# Patient Record
Sex: FEMALE | Race: WHITE
[De-identification: names, ages, dates, MRNs, and addresses within clinical notes are randomized per-mention and may not be internally consistent; named-entity substitution may affect disease eponyms.]

---

## 2021-04-19 ENCOUNTER — HOSPITAL ENCOUNTER (OUTPATIENT)
Dept: HOSPITAL 35 - LAB | Age: 61
End: 2021-04-19
Attending: THORACIC SURGERY (CARDIOTHORACIC VASCULAR SURGERY)
Payer: COMMERCIAL

## 2021-04-19 ENCOUNTER — HOSPITAL ENCOUNTER (OUTPATIENT)
Dept: HOSPITAL 35 - ULTRA | Age: 61
End: 2021-04-19
Attending: THORACIC SURGERY (CARDIOTHORACIC VASCULAR SURGERY)
Payer: COMMERCIAL

## 2021-04-19 DIAGNOSIS — Z20.822: ICD-10-CM

## 2021-04-19 DIAGNOSIS — Z01.818: Primary | ICD-10-CM

## 2021-04-19 DIAGNOSIS — I65.23: ICD-10-CM

## 2021-04-19 DIAGNOSIS — Z01.812: Primary | ICD-10-CM

## 2021-04-19 DIAGNOSIS — J98.11: ICD-10-CM

## 2021-04-19 DIAGNOSIS — I25.119: ICD-10-CM

## 2021-04-19 LAB
ALBUMIN SERPL-MCNC: 4.2 G/DL (ref 3.4–5)
ALT SERPL-CCNC: 37 U/L (ref 14–59)
ANION GAP SERPL CALC-SCNC: 9 MMOL/L (ref 7–16)
APTT BLD: 24.3 SECONDS (ref 24.5–32.8)
AST SERPL-CCNC: 17 U/L (ref 15–37)
BACTERIA-REFLEX: (no result) /HPF
BASOPHILS NFR BLD AUTO: 1 % (ref 0–2)
BILIRUB SERPL-MCNC: 0.4 MG/DL (ref 0.2–1)
BILIRUB UR-MCNC: NEGATIVE MG/DL
BUN SERPL-MCNC: 15 MG/DL (ref 7–18)
CALCIUM SERPL-MCNC: 9.6 MG/DL (ref 8.5–10.1)
CHLORIDE SERPL-SCNC: 102 MMOL/L (ref 98–107)
CO2 SERPL-SCNC: 28 MMOL/L (ref 21–32)
COLOR UR: YELLOW
CREAT SERPL-MCNC: 0.8 MG/DL (ref 0.6–1)
EOSINOPHIL NFR BLD: 1.2 % (ref 0–3)
ERYTHROCYTE [DISTWIDTH] IN BLOOD BY AUTOMATED COUNT: 13.8 % (ref 10.5–14.5)
FINE GRAN CASTS #/AREA URNS LPF: (no result) /LPF
GLUCOSE SERPL-MCNC: 165 MG/DL (ref 74–106)
GRANULOCYTES NFR BLD MANUAL: 68.3 % (ref 36–66)
HCT VFR BLD CALC: 42.1 % (ref 37–47)
HGB BLD-MCNC: 14.3 GM/DL (ref 12–15)
HYALINE CASTS #/AREA URNS LPF: (no result) /LPF
INR PPP: 1
KETONES UR STRIP-MCNC: NEGATIVE MG/DL
LYMPHOCYTES NFR BLD AUTO: 22.9 % (ref 24–44)
MCH RBC QN AUTO: 29.4 PG (ref 26–34)
MCHC RBC AUTO-ENTMCNC: 34 G/DL (ref 28–37)
MCV RBC: 86.2 FL (ref 80–100)
MONOCYTES NFR BLD: 6.6 % (ref 1–8)
NEUTROPHILS # BLD: 6 THOU/UL (ref 1.4–8.2)
PLATELET # BLD: 297 THOU/UL (ref 150–400)
POTASSIUM SERPL-SCNC: 4 MMOL/L (ref 3.5–5.1)
PROT SERPL-MCNC: 7.4 G/DL (ref 6.4–8.2)
PROTHROMBIN TIME: 10.9 SECONDS (ref 9.3–11.4)
RBC # BLD AUTO: 4.88 MIL/UL (ref 4.2–5)
RBC # UR STRIP: NEGATIVE /UL
SODIUM SERPL-SCNC: 139 MMOL/L (ref 136–145)
SP GR UR STRIP: 1.01 (ref 1–1.03)
SQUAMOUS: (no result) /LPF (ref 0–3)
URINE CLARITY: (no result)
URINE GLUCOSE-RANDOM*: NEGATIVE
URINE LEUKOCYTES-REFLEX: (no result)
URINE NITRITE-REFLEX: NEGATIVE
URINE PROTEIN (DIPSTICK): NEGATIVE
URINE WBC-REFLEX: (no result) /HPF (ref 0–5)
UROBILINOGEN UR STRIP-ACNC: 0.2 E.U./DL (ref 0.2–1)
WBC # BLD AUTO: 8.8 THOU/UL (ref 4–11)

## 2021-04-20 LAB
EST. AVERAGE GLUCOSE BLD GHB EST-MCNC: 160 MG/DL
GLYCOHEMOGLOBIN (HGB A1C): 7.2 % (ref 4.8–5.6)

## 2021-04-21 ENCOUNTER — HOSPITAL ENCOUNTER (INPATIENT)
Dept: HOSPITAL 35 - CATH | Age: 61
LOS: 6 days | Discharge: HOME | DRG: 236 | End: 2021-04-27
Attending: INTERNAL MEDICINE | Admitting: INTERNAL MEDICINE
Payer: COMMERCIAL

## 2021-04-21 VITALS — SYSTOLIC BLOOD PRESSURE: 126 MMHG | DIASTOLIC BLOOD PRESSURE: 53 MMHG

## 2021-04-21 VITALS — HEIGHT: 64.02 IN | WEIGHT: 193.5 LBS | BODY MASS INDEX: 33.03 KG/M2

## 2021-04-21 VITALS — DIASTOLIC BLOOD PRESSURE: 65 MMHG | SYSTOLIC BLOOD PRESSURE: 126 MMHG

## 2021-04-21 VITALS — SYSTOLIC BLOOD PRESSURE: 120 MMHG | DIASTOLIC BLOOD PRESSURE: 63 MMHG

## 2021-04-21 VITALS — DIASTOLIC BLOOD PRESSURE: 63 MMHG | SYSTOLIC BLOOD PRESSURE: 125 MMHG

## 2021-04-21 VITALS — DIASTOLIC BLOOD PRESSURE: 63 MMHG | SYSTOLIC BLOOD PRESSURE: 114 MMHG

## 2021-04-21 VITALS — SYSTOLIC BLOOD PRESSURE: 143 MMHG | DIASTOLIC BLOOD PRESSURE: 79 MMHG

## 2021-04-21 VITALS — DIASTOLIC BLOOD PRESSURE: 70 MMHG | SYSTOLIC BLOOD PRESSURE: 132 MMHG

## 2021-04-21 VITALS — DIASTOLIC BLOOD PRESSURE: 71 MMHG | SYSTOLIC BLOOD PRESSURE: 122 MMHG

## 2021-04-21 DIAGNOSIS — I10: ICD-10-CM

## 2021-04-21 DIAGNOSIS — I65.21: ICD-10-CM

## 2021-04-21 DIAGNOSIS — Z91.040: ICD-10-CM

## 2021-04-21 DIAGNOSIS — I25.10: Primary | ICD-10-CM

## 2021-04-21 DIAGNOSIS — E66.01: ICD-10-CM

## 2021-04-21 DIAGNOSIS — Z79.899: ICD-10-CM

## 2021-04-21 DIAGNOSIS — K21.9: ICD-10-CM

## 2021-04-21 DIAGNOSIS — Z82.49: ICD-10-CM

## 2021-04-21 DIAGNOSIS — R53.81: ICD-10-CM

## 2021-04-21 DIAGNOSIS — E78.00: ICD-10-CM

## 2021-04-21 DIAGNOSIS — E11.9: ICD-10-CM

## 2021-04-21 DIAGNOSIS — Z79.82: ICD-10-CM

## 2021-04-21 PROCEDURE — 47002: CPT

## 2021-04-21 PROCEDURE — 56524: CPT

## 2021-04-21 PROCEDURE — 57167: CPT

## 2021-04-21 PROCEDURE — 47001 NDL BIOPSY LVR TM OTH MAJ PX: CPT

## 2021-04-21 PROCEDURE — 55415: CPT

## 2021-04-21 PROCEDURE — 56527: CPT

## 2021-04-21 PROCEDURE — 48889: CPT

## 2021-04-21 PROCEDURE — 50249: CPT

## 2021-04-21 PROCEDURE — 50010 RENAL EXPLORATION: CPT

## 2021-04-21 PROCEDURE — 10081 I&D PILONIDAL CYST COMP: CPT

## 2021-04-21 PROCEDURE — 62950: CPT

## 2021-04-21 PROCEDURE — 52131: CPT

## 2021-04-21 PROCEDURE — 65020: CPT

## 2021-04-21 PROCEDURE — 56534: CPT

## 2021-04-21 PROCEDURE — 53358: CPT

## 2021-04-21 PROCEDURE — 56719: CPT

## 2021-04-21 PROCEDURE — 56531: CPT

## 2021-04-21 PROCEDURE — 47297: CPT

## 2021-04-21 PROCEDURE — 54118: CPT

## 2021-04-21 PROCEDURE — 57093: CPT

## 2021-04-21 PROCEDURE — 47000 NEEDLE BIOPSY OF LIVER PERQ: CPT

## 2021-04-21 PROCEDURE — 65003: CPT

## 2021-04-21 PROCEDURE — 65090: CPT

## 2021-04-21 PROCEDURE — 56760: CPT

## 2021-04-21 PROCEDURE — 50643: CPT

## 2021-04-21 PROCEDURE — 56668: CPT

## 2021-04-21 PROCEDURE — 65135 INSERT OCULAR IMPLANT: CPT

## 2021-04-21 PROCEDURE — 56525: CPT

## 2021-04-21 PROCEDURE — 53327: CPT

## 2021-04-21 PROCEDURE — 50953 ENDOSCOPY OF URETER: CPT

## 2021-04-21 PROCEDURE — 50668: CPT

## 2021-04-21 PROCEDURE — 56528: CPT

## 2021-04-21 PROCEDURE — 56526: CPT

## 2021-04-21 PROCEDURE — 52287 CYSTOSCOPY CHEMODENERVATION: CPT

## 2021-04-21 PROCEDURE — 56455: CPT

## 2021-04-21 PROCEDURE — 10078: CPT

## 2021-04-21 PROCEDURE — 62110: CPT

## 2021-04-21 PROCEDURE — 51301: CPT

## 2021-04-22 VITALS — SYSTOLIC BLOOD PRESSURE: 101 MMHG | DIASTOLIC BLOOD PRESSURE: 56 MMHG

## 2021-04-22 VITALS — DIASTOLIC BLOOD PRESSURE: 63 MMHG | SYSTOLIC BLOOD PRESSURE: 112 MMHG

## 2021-04-22 VITALS — DIASTOLIC BLOOD PRESSURE: 59 MMHG | SYSTOLIC BLOOD PRESSURE: 104 MMHG

## 2021-04-22 VITALS — SYSTOLIC BLOOD PRESSURE: 105 MMHG | DIASTOLIC BLOOD PRESSURE: 59 MMHG

## 2021-04-22 VITALS — SYSTOLIC BLOOD PRESSURE: 104 MMHG | DIASTOLIC BLOOD PRESSURE: 57 MMHG

## 2021-04-22 VITALS — SYSTOLIC BLOOD PRESSURE: 102 MMHG | DIASTOLIC BLOOD PRESSURE: 60 MMHG

## 2021-04-22 VITALS — DIASTOLIC BLOOD PRESSURE: 58 MMHG | SYSTOLIC BLOOD PRESSURE: 106 MMHG

## 2021-04-22 VITALS — SYSTOLIC BLOOD PRESSURE: 103 MMHG | DIASTOLIC BLOOD PRESSURE: 51 MMHG

## 2021-04-22 VITALS — DIASTOLIC BLOOD PRESSURE: 71 MMHG | SYSTOLIC BLOOD PRESSURE: 98 MMHG

## 2021-04-22 VITALS — SYSTOLIC BLOOD PRESSURE: 109 MMHG | DIASTOLIC BLOOD PRESSURE: 59 MMHG

## 2021-04-22 VITALS — DIASTOLIC BLOOD PRESSURE: 59 MMHG | SYSTOLIC BLOOD PRESSURE: 105 MMHG

## 2021-04-22 VITALS — DIASTOLIC BLOOD PRESSURE: 66 MMHG | SYSTOLIC BLOOD PRESSURE: 102 MMHG

## 2021-04-22 VITALS — DIASTOLIC BLOOD PRESSURE: 61 MMHG | SYSTOLIC BLOOD PRESSURE: 100 MMHG

## 2021-04-22 LAB
ANION GAP SERPL CALC-SCNC: 10 MMOL/L (ref 7–16)
APTT BLD: 25.1 SECONDS (ref 24.5–32.8)
APTT BLD: 27.6 SECONDS (ref 24.5–32.8)
BASE EXCESS STD BLDA CALC-SCNC: -3 MMOL/L
BASE EXCESS STD BLDA CALC-SCNC: 0 MMOL/L
BE(VIVO): -3.7 MMOL/L
BE(VIVO): -5 MMOL/L
BE(VIVO): -5.4 MMOL/L
BUN SERPL-MCNC: 12 MG/DL (ref 7–18)
CALCIUM SERPL-MCNC: 8.2 MG/DL (ref 8.5–10.1)
CHLORIDE SERPL-SCNC: 109 MMOL/L (ref 98–107)
CO2 SERPL-SCNC: 24 MMOL/L (ref 21–32)
CREAT SERPL-MCNC: 0.8 MG/DL (ref 0.6–1)
ERYTHROCYTE [DISTWIDTH] IN BLOOD BY AUTOMATED COUNT: 13 % (ref 10.5–14.5)
ERYTHROCYTE [DISTWIDTH] IN BLOOD BY AUTOMATED COUNT: 13.6 % (ref 10.5–14.5)
FIBRINOGEN PPP-MCNC: 181 MG/DL (ref 210–360)
GLUCOSE BLD-MCNC: 143 MG/DL (ref 70–99)
GLUCOSE BLD-MCNC: 167 MG/DL (ref 70–99)
GLUCOSE SERPL-MCNC: 206 MG/DL (ref 74–106)
HCO3 BLD-SCNC: 18.2 MMOL/L (ref 22–26)
HCO3 BLD-SCNC: 19.8 MMOL/L (ref 22–26)
HCO3 BLD-SCNC: 21.1 MMOL/L (ref 22–26)
HCO3 BLDA-SCNC: 22.1 MMOL/L (ref 22–26)
HCO3 BLDA-SCNC: 25 MMOL/L (ref 22–26)
HCT VFR BLD AUTO: 23 % (ref 37–47)
HCT VFR BLD AUTO: 33 % (ref 37–47)
HCT VFR BLD CALC: 22.1 % (ref 37–47)
HCT VFR BLD CALC: 29.1 % (ref 37–47)
HGB BLD-MCNC: 10.2 GM/DL (ref 12–15)
HGB BLD-MCNC: 11.2 G/DL (ref 12–15)
HGB BLD-MCNC: 6.8 G/DL (ref 12–15)
HGB BLD-MCNC: 7.7 GM/DL (ref 12–15)
INR PPP: 1.12
INR PPP: 1.42
MAGNESIUM SERPL-MCNC: 2.2 MG/DL (ref 1.8–2.4)
MCH RBC QN AUTO: 30.2 PG (ref 26–34)
MCH RBC QN AUTO: 30.4 PG (ref 26–34)
MCHC RBC AUTO-ENTMCNC: 34.9 G/DL (ref 28–37)
MCHC RBC AUTO-ENTMCNC: 35.1 G/DL (ref 28–37)
MCV RBC: 86.3 FL (ref 80–100)
MCV RBC: 86.6 FL (ref 80–100)
PCO2 BLD: 29.1 MMHG (ref 35–45)
PCO2 BLD: 35.8 MMHG (ref 35–45)
PCO2 BLD: 37.1 MMHG (ref 35–45)
PCO2 BLDA: 36.4 MMHG (ref 35–45)
PCO2 BLDA: 40.9 MMHG (ref 35–45)
PH BLDA: 7.39 [PH] (ref 7.36–7.45)
PH BLDA: 7.39 [PH] (ref 7.36–7.45)
PLATELET # BLD: 161 THOU/UL (ref 150–400)
PLATELET # BLD: 195 THOU/UL (ref 150–400)
PO2 BLD: 108.9 MMHG (ref 80–100)
PO2 BLD: 161.7 MMHG (ref 80–100)
PO2 BLD: 97.7 MMHG (ref 80–100)
POC CA IONIZED: 4.8 MG/DL (ref 4.5–5.3)
POC CA IONIZED: 5.1 MG/DL (ref 4.5–5.3)
POTASSIUM SERPL-SCNC: 3.3 MMOL/L (ref 3.5–5.1)
POTASSIUM SERPL-SCNC: 3.4 MMOL/L (ref 3.5–5.1)
POTASSIUM SERPL-SCNC: 3.4 MMOL/L (ref 3.5–5.1)
PROTHROMBIN TIME: 12.1 SECONDS (ref 9.3–11.4)
PROTHROMBIN TIME: 15.2 SECONDS (ref 9.3–11.4)
RBC # BLD AUTO: 2.56 MIL/UL (ref 4.2–5)
RBC # BLD AUTO: 3.36 MIL/UL (ref 4.2–5)
SODIUM SERPL-SCNC: 140 MMOL/L (ref 136–145)
SODIUM SERPL-SCNC: 143 MMOL/L (ref 136–145)
SODIUM SERPL-SCNC: 143 MMOL/L (ref 136–145)
WBC # BLD AUTO: 15.9 THOU/UL (ref 4–11)
WBC # BLD AUTO: 23.1 THOU/UL (ref 4–11)

## 2021-04-22 NOTE — NUR
chart review. note she will be going for or today for cabg. unable to visit
with her. will cont following as needed for dc needs. noted independent prior
to hospital.

## 2021-04-22 NOTE — NUR
Nutrition: Planned CABG today. Consult received. RD will followup to address
education needs when out of ICU.

## 2021-04-22 NOTE — NUR
Patient was able to get some sleep this shift. Heart rate and rhythm stable.
Blood pressures stable. Adequate oxygenation on room air. Chlorhexadine bath
given at HS and this am. Patient voiding adequate amounts. NPO since midnight.
Patient states she is ready for surgery. Patient is progressing towards goals.
See documentation on interventions for assessment details. No am lab ordered
for today.

## 2021-04-22 NOTE — NUR
1330-RECEIVED FROM O.R. S/P CABG W OH TEAM IN ATTENDANCE.NO GTTS.--VW
1400-FR.ROSANNE IN.--VW
1530-FRIEND PALLAVI & HIS WIFE IN FOR BRIEF VISIT.--VW
1830-PT HAS DONE WELL. R.T. WORKING W PT.CPAP TRIAL DONE-VOLUMES A LITTLE LOW
BUT OVERALL DOING WELL. PT VERY AGITATED & ANXIOUS AT TIMES.MUCH REASSURANCE
GIVEN.--VW
1850-CARE TURNED OVER TO ONCOMING RN.--VW

## 2021-04-23 VITALS — DIASTOLIC BLOOD PRESSURE: 64 MMHG | SYSTOLIC BLOOD PRESSURE: 123 MMHG

## 2021-04-23 VITALS — DIASTOLIC BLOOD PRESSURE: 64 MMHG | SYSTOLIC BLOOD PRESSURE: 117 MMHG

## 2021-04-23 VITALS — DIASTOLIC BLOOD PRESSURE: 80 MMHG | SYSTOLIC BLOOD PRESSURE: 136 MMHG

## 2021-04-23 LAB
ANION GAP SERPL CALC-SCNC: 13 MMOL/L (ref 7–16)
BUN SERPL-MCNC: 14 MG/DL (ref 7–18)
CALCIUM SERPL-MCNC: 7.9 MG/DL (ref 8.5–10.1)
CHLORIDE SERPL-SCNC: 106 MMOL/L (ref 98–107)
CO2 SERPL-SCNC: 22 MMOL/L (ref 21–32)
CREAT SERPL-MCNC: 0.7 MG/DL (ref 0.6–1)
ERYTHROCYTE [DISTWIDTH] IN BLOOD BY AUTOMATED COUNT: 13.9 % (ref 10.5–14.5)
GLUCOSE SERPL-MCNC: 162 MG/DL (ref 74–106)
HCT VFR BLD CALC: 25.7 % (ref 37–47)
HGB BLD-MCNC: 8.8 GM/DL (ref 12–15)
MAGNESIUM SERPL-MCNC: 2.1 MG/DL (ref 1.8–2.4)
MCH RBC QN AUTO: 29.6 PG (ref 26–34)
MCHC RBC AUTO-ENTMCNC: 34.2 G/DL (ref 28–37)
MCV RBC: 86.6 FL (ref 80–100)
PLATELET # BLD: 165 THOU/UL (ref 150–400)
POTASSIUM SERPL-SCNC: 3.6 MMOL/L (ref 3.5–5.1)
RBC # BLD AUTO: 2.97 MIL/UL (ref 4.2–5)
SODIUM SERPL-SCNC: 141 MMOL/L (ref 136–145)
WBC # BLD AUTO: 14.9 THOU/UL (ref 4–11)

## 2021-04-23 NOTE — NUR
Dr. Kelly here.  Update given.   Orders given.  Insulin gtt and Cardene
turned off.  Pt up in chair and tolorating well.

## 2021-04-23 NOTE — EKG
[address]  [address]
Phone:  [phone]                    ELECTROCARDIOGRAM REPORT      
_______________________________________________________________________________
 
Name:       HAJA FRANCIS           Room #:         250-P       ADM IN  
M.R.#:      [id number]     Account #:      [id number]  
Admission:  21    Attend Phys:    Silvino Rendon MD  
Discharge:              Date of Birth:  [birth year]  
                                                          Report #: [id number]
   [id number]
_______________________________________________________________________________
                          [facility]
                                       
Test Date:    2021               Test Time:    13:42:47
Pat Name:     HAJA FRANCIS             Department:   
Patient ID:   [id number]            Room:         250 P
Gender:       F                        Technician:   NAIN
:          1960               Requested By: Errol Pederson
Order Number: [id number] MD:   Santiago Bush
                                 Measurements
Intervals                              Axis          
Rate:         84                       P:            71
IA:           168                      QRS:          60
QRSD:         94                       T:            -1
QT:           411                                    
QTc:          486                                    
                           Interpretive Statements
Sinus rhythm
Borderline prolonged QT interval
No previous ECG available for comparison
Electronically Signed On 2021 7:04:34 CDT by Santiago Bush
[email or web address]
 
 
 
 
 
 
 
 
 
 
 
 
 
 
 
 
 
 
 
 
 
 
  <ELECTRONICALLY SIGNED>
   By: Santiago Bush MD, [facility]    
  21     [id number]
D: 21 1342                           _____________________________________
T: 21 1342                           Santiago Bush MD, FACC      /EPI

## 2021-04-23 NOTE — EKG
[address]
[place]  [address]
Phone:  [phone]                    ELECTROCARDIOGRAM REPORT      
_______________________________________________________________________________
 
Name:       HAJA FRANCIS           Room #:         250-P       ADM IN  
M.R.#:      [id number]     Account #:      [id number]  
Admission:  21    Attend Phys:    Silvino Rendon MD  
Discharge:              Date of Birth:  [birth year]  
                                                          Report #: [id number]
   [id number]
_______________________________________________________________________________
                          [facility]
                                       
Test Date:    2021               Test Time:    07:19:12
Pat Name:     HAJA FRANCSI             Department:   
Patient ID:   [id number]            Room:         250 P
Gender:       F                        Technician:   NAIN
:          1960               Requested By: Errol Pederson
Order Number: [id number] MD:   Kendrick Diaz
                                 Measurements
Intervals                              Axis          
Rate:         93                       P:            46
AL:           134                      QRS:          22
QRSD:         80                       T:            20
QT:           363                                    
QTc:          452                                    
                           Interpretive Statements
Sinus rhythm
Abnormal R-wave progression, early transition
Compared to ECG 2021 13:42:47
No significant changes
Electronically Signed On 2021 8:42:27 CDT by Kendrick Diaz
[email or web address]
 
 
 
 
 
 
 
 
 
 
 
 
 
 
 
 
 
 
 
 
 
  <ELECTRONICALLY SIGNED>
   By: Kendrick Diaz MD, Located within [facility]   
  [id number]
D: 21                           _____________________________________
T: 21                           Kendrick Diaz MD, [facility]     /EPI

## 2021-04-23 NOTE — NUR
unable to visit with prabhjot rt visitor in room. noted she independent prior
to hospital. no dme. will cont following as needed for dc needs.
dcp home.

## 2021-04-24 VITALS — SYSTOLIC BLOOD PRESSURE: 124 MMHG | DIASTOLIC BLOOD PRESSURE: 80 MMHG

## 2021-04-24 VITALS — DIASTOLIC BLOOD PRESSURE: 62 MMHG | SYSTOLIC BLOOD PRESSURE: 104 MMHG

## 2021-04-24 VITALS — SYSTOLIC BLOOD PRESSURE: 136 MMHG | DIASTOLIC BLOOD PRESSURE: 78 MMHG

## 2021-04-24 VITALS — DIASTOLIC BLOOD PRESSURE: 80 MMHG | SYSTOLIC BLOOD PRESSURE: 144 MMHG

## 2021-04-24 VITALS — SYSTOLIC BLOOD PRESSURE: 131 MMHG | DIASTOLIC BLOOD PRESSURE: 60 MMHG

## 2021-04-24 VITALS — SYSTOLIC BLOOD PRESSURE: 135 MMHG | DIASTOLIC BLOOD PRESSURE: 77 MMHG

## 2021-04-24 VITALS — DIASTOLIC BLOOD PRESSURE: 74 MMHG | SYSTOLIC BLOOD PRESSURE: 122 MMHG

## 2021-04-24 VITALS — SYSTOLIC BLOOD PRESSURE: 112 MMHG | DIASTOLIC BLOOD PRESSURE: 77 MMHG

## 2021-04-24 LAB
ALBUMIN SERPL-MCNC: 3.3 G/DL (ref 3.4–5)
ALT SERPL-CCNC: 29 U/L (ref 30–65)
ANION GAP SERPL CALC-SCNC: 9 MMOL/L (ref 7–16)
AST SERPL-CCNC: 26 U/L (ref 15–37)
BILIRUB SERPL-MCNC: 0.3 MG/DL (ref 0.2–1)
BUN SERPL-MCNC: 10 MG/DL (ref 7–18)
CALCIUM SERPL-MCNC: 7.8 MG/DL (ref 8.5–10.1)
CHLORIDE SERPL-SCNC: 102 MMOL/L (ref 98–107)
CO2 SERPL-SCNC: 26 MMOL/L (ref 21–32)
CREAT SERPL-MCNC: 0.6 MG/DL (ref 0.6–1)
ERYTHROCYTE [DISTWIDTH] IN BLOOD BY AUTOMATED COUNT: 13.7 % (ref 10.5–14.5)
GLUCOSE SERPL-MCNC: 169 MG/DL (ref 74–106)
HCT VFR BLD CALC: 24.3 % (ref 37–47)
HGB BLD-MCNC: 8.5 GM/DL (ref 12–15)
MCH RBC QN AUTO: 30.4 PG (ref 26–34)
MCHC RBC AUTO-ENTMCNC: 35 G/DL (ref 28–37)
MCV RBC: 86.8 FL (ref 80–100)
PLATELET # BLD: 158 THOU/UL (ref 150–400)
POTASSIUM SERPL-SCNC: 4 MMOL/L (ref 3.5–5.1)
PROT SERPL-MCNC: 5.7 G/DL (ref 6.4–8.2)
RBC # BLD AUTO: 2.8 MIL/UL (ref 4.2–5)
SODIUM SERPL-SCNC: 137 MMOL/L (ref 136–145)
WBC # BLD AUTO: 14.6 THOU/UL (ref 4–11)

## 2021-04-24 NOTE — NUR
progressing today. back to bed for late breakfast, back up in chair prior to
lunch. discomfort uncontrolled in am, hydrocodone 2 tabs given with pain
relief. sr, dc'd RIJ introducer and radial dread with guaze/opsite dressings
applied. pct- 40cc drainage. pulling 500cc on incentive spirometer x 10,
taking deep breaths/splinting with nonproductive cough. poor appetite. friends
present for her support.
 transferred to ccu #215 per wheelchair on cardiac monitor and o2.

## 2021-04-24 NOTE — NUR
PT TRANSFERED FROM THE ICU THIS AFTERNOON.  ORIENTED TO ROOM AND BEDSPACE.
COMPLETE PAIN RELIEF FROM MEDS GIVEN PRIOR TO TRRANSFER / CO'S OF NAUSEA,
GIVEN ZOFRAN WITH GOOD RELIEF.  DR LAY REMOVED PLEURAL CHEST TUBE THIS
AFTERNOON - CHEST XRAY COMPLETED.  PT NOT APPEARING TO HAVE ANY DIFFICULTY
BREATHING POST REMOVAL.  ACCUCHECK AS CHARTED.  APPEARS TO BE RESTING AT THE
PRESENT TIME.

## 2021-04-25 VITALS — DIASTOLIC BLOOD PRESSURE: 71 MMHG | SYSTOLIC BLOOD PRESSURE: 119 MMHG

## 2021-04-25 VITALS — DIASTOLIC BLOOD PRESSURE: 63 MMHG | SYSTOLIC BLOOD PRESSURE: 122 MMHG

## 2021-04-25 VITALS — DIASTOLIC BLOOD PRESSURE: 61 MMHG | SYSTOLIC BLOOD PRESSURE: 112 MMHG

## 2021-04-25 VITALS — SYSTOLIC BLOOD PRESSURE: 113 MMHG | DIASTOLIC BLOOD PRESSURE: 74 MMHG

## 2021-04-25 VITALS — DIASTOLIC BLOOD PRESSURE: 66 MMHG | SYSTOLIC BLOOD PRESSURE: 121 MMHG

## 2021-04-25 NOTE — NUR
ASSESSMENTS AS CHARTED, MEDS CHARTED AS GIVEN. TRANSFERED FROM ICU DURING DAY.
PO DAY 2 FROM CABG X 4. UP TO BSC WITH ASSIST X 1. GOOD URINE OUTPUT. WOUND
VAC IN PLACE AND SEALED WELL. PACER WIRES COVERED. CHEST TUBE WAS DC'D TODAY
WITH 3 MM LEFT PNEUMOTHORAX AND MILD CARDIOMEGALY ON CXR. PLAN OF CARE IS WIRE
REMOVAL TODAY, CONTINUE REHAB, SHOWER THEN HOME TODAY OR TOMORROW. FALL
PRECAUTIONS IN PLACE DURING SHIFT.

## 2021-04-25 NOTE — NUR
ASSESSMENT AS CHARTED - MEDS AS PER MAR -  NO CO'S OF PAIN OF NASUEA.  SOURAV
DIET AND FLUIDS.  PATIENT ORDERING FROM [facility] AND STATES SHE FEELS APPITITE
IS COMING BACK.  UP TO THE BSC WITH 1 ASSIST - AMBUALTED IN THE HALLS WITH
PHYS THERAPY AND USE OF WALKER DID WELL - UP TO THE CHAIR FOR THE AM -  PT
GIVEN GAUZE FOR R EYE - PATIENT FEELS SHE IRRIATED EYE WHEN SHE PLACED CONTACT
BACK IN IT AND WANTED TO COVER IT FOR THE AFTERNOON -EVENEING.  ACCUCHECKS AS
CHARTED -  WOUND VAC REMAINS INSITU  NO CO'S AT THE PRESENT TIME.

## 2021-04-26 VITALS — DIASTOLIC BLOOD PRESSURE: 77 MMHG | SYSTOLIC BLOOD PRESSURE: 121 MMHG

## 2021-04-26 VITALS — DIASTOLIC BLOOD PRESSURE: 73 MMHG | SYSTOLIC BLOOD PRESSURE: 128 MMHG

## 2021-04-26 VITALS — SYSTOLIC BLOOD PRESSURE: 119 MMHG | DIASTOLIC BLOOD PRESSURE: 61 MMHG

## 2021-04-26 VITALS — SYSTOLIC BLOOD PRESSURE: 123 MMHG | DIASTOLIC BLOOD PRESSURE: 66 MMHG

## 2021-04-26 VITALS — SYSTOLIC BLOOD PRESSURE: 126 MMHG | DIASTOLIC BLOOD PRESSURE: 68 MMHG

## 2021-04-26 LAB
ALBUMIN SERPL-MCNC: 3.1 G/DL (ref 3.4–5)
ALT SERPL-CCNC: 35 U/L (ref 14–59)
ANION GAP SERPL CALC-SCNC: 7 MMOL/L (ref 7–16)
AST SERPL-CCNC: 20 U/L (ref 15–37)
BASOPHILS NFR BLD AUTO: 0.4 % (ref 0–2)
BILIRUB SERPL-MCNC: 0.5 MG/DL (ref 0.2–1)
BUN SERPL-MCNC: 10 MG/DL (ref 7–18)
CALCIUM SERPL-MCNC: 8.4 MG/DL (ref 8.5–10.1)
CHLORIDE SERPL-SCNC: 104 MMOL/L (ref 98–107)
CO2 SERPL-SCNC: 29 MMOL/L (ref 21–32)
CREAT SERPL-MCNC: 0.7 MG/DL (ref 0.6–1)
EOSINOPHIL NFR BLD: 0.8 % (ref 0–3)
ERYTHROCYTE [DISTWIDTH] IN BLOOD BY AUTOMATED COUNT: 13.9 % (ref 10.5–14.5)
GLUCOSE SERPL-MCNC: 157 MG/DL (ref 74–106)
GRANULOCYTES NFR BLD MANUAL: 75 % (ref 36–66)
HCT VFR BLD CALC: 26.1 % (ref 37–47)
HGB BLD-MCNC: 9 GM/DL (ref 12–15)
LYMPHOCYTES NFR BLD AUTO: 17 % (ref 24–44)
MCH RBC QN AUTO: 30.1 PG (ref 26–34)
MCHC RBC AUTO-ENTMCNC: 34.4 G/DL (ref 28–37)
MCV RBC: 87.5 FL (ref 80–100)
MONOCYTES NFR BLD: 6.8 % (ref 1–8)
NEUTROPHILS # BLD: 9.5 THOU/UL (ref 1.4–8.2)
PLATELET # BLD: 266 THOU/UL (ref 150–400)
POTASSIUM SERPL-SCNC: 3.6 MMOL/L (ref 3.5–5.1)
PROT SERPL-MCNC: 6.1 G/DL (ref 6.4–8.2)
RBC # BLD AUTO: 2.98 MIL/UL (ref 4.2–5)
SODIUM SERPL-SCNC: 140 MMOL/L (ref 136–145)
WBC # BLD AUTO: 12.7 THOU/UL (ref 4–11)

## 2021-04-26 NOTE — EKG
[address] [employer]
[place]  [address]
Phone:  [phone]                    ELECTROCARDIOGRAM REPORT      
_______________________________________________________________________________
 
Name:       HAJA FRANCIS           Room #:         215-       ADM IN  
M.R.#:      [id number]     Account #:      [id number]  
Admission:  21    Attend Phys:    Daniel Gomez MD      
Discharge:              Date of Birth:  60  
                                                          Report #: [id number]
   [id number]
_______________________________________________________________________________
                          [facility]
                                       
Test Date:    2021               Test Time:    07:43:31
Pat Name:     HAJA FRANCIS             Department:   
Patient ID:   [id number]            Room:         215 P
Gender:       F                        Technician:   NAIN
:          1960               Requested By: Liu Kelly
Order Number: [id number] MD:   Kendrick Diaz
                                 Measurements
Intervals                              Axis          
Rate:         74                       P:            63
HI:           143                      QRS:          60
QRSD:         93                       T:            -20
QT:           413                                    
QTc:          459                                    
                           Interpretive Statements
Sinus rhythm
Borderline T wave abnormalities
Compared to ECG 2021 07:19:12
Early R wave progression no longer present
Electronically Signed On 2021 8:31:28 CDT by Kendrick Diaz
[email or web address]
 
 
 
 
 
 
 
 
 
 
 
 
 
 
 
 
 
 
 
 
 
  <ELECTRONICALLY SIGNED>
   By: Kendrick Diaz MD, [facility]   
  [id number]
D: 04/26/21 0743                           _____________________________________
T: 21                           Kendrick Diaz MD, [facility]     /EPI

## 2021-04-26 NOTE — NUR
PT CARE ASSUMED AT 0700. ASSESSMENTS AS CHARTED. MEDICATIONS AS CHARTED. RAC
IV. LH IV; D/C'D, INFILTRATED. SINUS RHYTHM. BSC. ACHS. CXR VIA WHEELCHAIR.
WOUND VAC TO STERNUM. POST OP DAY 4; CABG X 4.

## 2021-04-26 NOTE — NUR
ASSESSMENTS AS CHARTED, MEDS CHARTED AS GIVEN. PATIENT WALKED FROM 40 FEET
THIS EVENING STOPPING FREQUENTLY TO REST. PATIENT PASSING GAS, BUT NO BOWEL
MOVEMENT AS OF YET. FALL PRECAUTIONS IN PLACE DURING SHIFT.

## 2021-04-27 VITALS — SYSTOLIC BLOOD PRESSURE: 112 MMHG | DIASTOLIC BLOOD PRESSURE: 63 MMHG

## 2021-04-27 VITALS — DIASTOLIC BLOOD PRESSURE: 59 MMHG | SYSTOLIC BLOOD PRESSURE: 107 MMHG

## 2021-04-27 VITALS — DIASTOLIC BLOOD PRESSURE: 63 MMHG | SYSTOLIC BLOOD PRESSURE: 112 MMHG

## 2021-04-27 NOTE — NUR
Patient to discharge home today. 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 Family at
bedside. Discussed home health care and patient declines. Patient reports
plan to f/u with Dr Oneil in a week. She reports her family will be
assisting at home. no further needs

## 2021-04-27 NOTE — NUR
PT CARE ASSUMED AT 0700. ASSESSEMENTS AS CHARTED. MEDICATIONS AS CHARTED. RAC
IV. SINUS RHYTHM. HARVEST SITES BANDAGED. CHEST TUBE AND WIRES REMOVED. POST
OP DAY 6; CABG X 4. PT DISCHARGED TO HOME. DISCHARGE PAPERWORK SIGNED.
TELEMETRY D/C'D. IV D/C'D. PT HOME WITH PORTABLE WOUND VAC.

## 2021-04-27 NOTE — NUR
PT STATES HER INCISIONAL PAIN IS CONTROLLED BUT LEFT UPPER ARM AND HAND PAIN
REQUIRED PAIN MEDS AT HS AND WARM COMPRESS AND STATES IT WAS FEELING MUCH
BETTER BUT SHE IS STILL AWARE OF SOME DISCOMFORT, UP TO BR TO VOID THRU THE
NOC, INCISION WITH WOUND VAC INTACT, VSS, WILL CON'T TO MONITOR PER PPOC.